# Patient Record
Sex: MALE | Race: WHITE | NOT HISPANIC OR LATINO | Employment: UNEMPLOYED | URBAN - METROPOLITAN AREA
[De-identification: names, ages, dates, MRNs, and addresses within clinical notes are randomized per-mention and may not be internally consistent; named-entity substitution may affect disease eponyms.]

---

## 2017-08-30 NOTE — H&P
DATE OF SCHEDULED SURGERY:  2017.    REASON FOR SURGERY:  Desires surgical correction of abnormal uterine bleeding   and to proceed forward with gender reassignment surgery.    HISTORY OF PRESENT ILLNESS:  This is a 27-year-old  0, with a negative   urine pregnancy test on 2017 who attempts to proceed forward with her   definitive surgical correction of her abnormal uterine bleeding, especially   now that she is on testosterone management and removal of bilateral tubes and   ovaries.  Risks, benefits, and alternatives of all individual procedures were   addressed with the patient in detail over several visits.  She has asked   appropriate questions, signed the appropriate consents, and wished to proceed   forward with surgery as planned.  The patient has undergone a pelvic   ultrasound with a normal endometrial stripe, normal pelvic anatomy otherwise,   negative urine pregnancy test as stated above as well as a normal Pap smear   and cervical cultures.    PAST MEDICAL HISTORY:  lumbago.    PAST SURGICAL HISTORY:  Breast surgery in .    OBSTETRICAL HISTORY:  The patient is a nulligravid.    GYNECOLOGIC HISTORY:  The patient does now report abnormal uterine bleeding,   dysmenorrhea and pelvic pain.  Currently on testosterone injections.  Denies   any previous sexually transmitted diseases or pelvic infections.    SOCIAL HISTORY:  Single.  She denies the use of any alcohol, tobacco, or   recreational drug use.    MEDICATIONS:  Testosterone daily.    ALLERGIES:  No known drug allergies.    PHYSICAL EXAMINATION:  VITAL SIGNS:  She is afebrile, hemodynamically stable.  HEART:  Regular rate and rhythm.  CHEST:  Clear to auscultation bilaterally.  ABDOMEN:  Soft, nondistended, and nontender.  Bowel sounds are present.  PELVIC:  Exam shows normal external female genitalia.  Small uterus.  No   adnexal mass or tenderness to palpation were appreciated.  EXTREMITIES:  Nontender.    ASSESSMENT AND PLAN:  A  27-year-old now with menometrorrhagia, dysmenorrhea,   and pelvic pain in addition to her desire to proceed forward with gender   reassignment surgery interested in proceeding forward with surgery as   described above in the form of laparoscopic-assisted vaginal hysterectomy,   bilateral salpingo-oophorectomy.  Risks, benefits, and alternatives of all   individual procedures were addressed with the patient.  She has asked   appropriate questions, signed the appropriate consents and wishes to proceed   forward with surgery as planned.       ____________________________________     MD NARGIS BOO / ARMANDO    DD:  08/30/2017 14:10:21  DT:  08/30/2017 14:46:47    D#:  [id number]  Job#:  [id number]

## 2017-09-15 DIAGNOSIS — Z01.812 PRE-PROCEDURAL LABORATORY EXAMINATION: ICD-10-CM

## 2017-09-15 LAB
ANION GAP SERPL CALC-SCNC: 10 MMOL/L (ref 0–11.9)
BUN SERPL-MCNC: 9 MG/DL (ref 8–22)
CALCIUM SERPL-MCNC: 10.1 MG/DL (ref 8.5–10.5)
CHLORIDE SERPL-SCNC: 100 MMOL/L (ref 96–112)
CO2 SERPL-SCNC: 29 MMOL/L (ref 20–33)
CREAT SERPL-MCNC: 1.11 MG/DL (ref 0.5–1.4)
ERYTHROCYTE [DISTWIDTH] IN BLOOD BY AUTOMATED COUNT: 39.9 FL (ref 35.9–50)
GFR SERPL CREATININE-BSD FRML MDRD: 58 ML/MIN/1.73 M 2
GLUCOSE SERPL-MCNC: 87 MG/DL (ref 65–99)
HCT VFR BLD AUTO: 45.9 % (ref 37–47)
HGB BLD-MCNC: 15.6 G/DL (ref 12–16)
MCH RBC QN AUTO: 32.1 PG (ref 27–33)
MCHC RBC AUTO-ENTMCNC: 34 G/DL (ref 33.6–35)
MCV RBC AUTO: 94.4 FL (ref 81.4–97.8)
PLATELET # BLD AUTO: 250 K/UL (ref 164–446)
PMV BLD AUTO: 11.2 FL (ref 9–12.9)
POTASSIUM SERPL-SCNC: 3.5 MMOL/L (ref 3.6–5.5)
RBC # BLD AUTO: 4.86 M/UL (ref 4.2–5.4)
SODIUM SERPL-SCNC: 139 MMOL/L (ref 135–145)
WBC # BLD AUTO: 5.8 K/UL (ref 4.8–10.8)

## 2017-09-15 PROCEDURE — 85027 COMPLETE CBC AUTOMATED: CPT

## 2017-09-15 PROCEDURE — 80048 BASIC METABOLIC PNL TOTAL CA: CPT

## 2017-09-15 PROCEDURE — 36415 COLL VENOUS BLD VENIPUNCTURE: CPT

## 2017-09-19 ENCOUNTER — HOSPITAL ENCOUNTER (OUTPATIENT)
Facility: MEDICAL CENTER | Age: 27
End: 2017-09-19
Attending: SPECIALIST | Admitting: SPECIALIST
Payer: MEDICAID

## 2017-09-19 VITALS
TEMPERATURE: 98.4 F | BODY MASS INDEX: 24.91 KG/M2 | RESPIRATION RATE: 16 BRPM | SYSTOLIC BLOOD PRESSURE: 113 MMHG | WEIGHT: 158.73 LBS | HEART RATE: 64 BPM | HEIGHT: 67 IN | OXYGEN SATURATION: 94 % | DIASTOLIC BLOOD PRESSURE: 63 MMHG

## 2017-09-19 PROBLEM — R10.2 ADNEXAL TENDERNESS, RIGHT: Status: ACTIVE | Noted: 2017-09-19

## 2017-09-19 PROBLEM — N92.6 IRREGULAR MENSTRUAL CYCLE: Status: ACTIVE | Noted: 2017-09-19

## 2017-09-19 LAB — HCG SERPL QL: NEGATIVE

## 2017-09-19 PROCEDURE — 501838 HCHG SUTURE GENERAL: Performed by: SPECIALIST

## 2017-09-19 PROCEDURE — 700111 HCHG RX REV CODE 636 W/ 250 OVERRIDE (IP)

## 2017-09-19 PROCEDURE — A4338 INDWELLING CATHETER LATEX: HCPCS | Performed by: SPECIALIST

## 2017-09-19 PROCEDURE — 500854 HCHG NEEDLE, INSUFFLATION FOR STEP: Performed by: SPECIALIST

## 2017-09-19 PROCEDURE — 700101 HCHG RX REV CODE 250

## 2017-09-19 PROCEDURE — 160041 HCHG SURGERY MINUTES - EA ADDL 1 MIN LEVEL 4: Performed by: SPECIALIST

## 2017-09-19 PROCEDURE — 84703 CHORIONIC GONADOTROPIN ASSAY: CPT

## 2017-09-19 PROCEDURE — 88307 TISSUE EXAM BY PATHOLOGIST: CPT

## 2017-09-19 PROCEDURE — 501579 HCHG TROCAR, STEP 5MM: Performed by: SPECIALIST

## 2017-09-19 PROCEDURE — 160002 HCHG RECOVERY MINUTES (STAT): Performed by: SPECIALIST

## 2017-09-19 PROCEDURE — 160029 HCHG SURGERY MINUTES - 1ST 30 MINS LEVEL 4: Performed by: SPECIALIST

## 2017-09-19 PROCEDURE — 501577 HCHG TROCAR, STEP 11MM: Performed by: SPECIALIST

## 2017-09-19 PROCEDURE — 160035 HCHG PACU - 1ST 60 MINS PHASE I: Performed by: SPECIALIST

## 2017-09-19 PROCEDURE — 502703 HCHG DEVICE, LIGASURE V SEALER: Performed by: SPECIALIST

## 2017-09-19 PROCEDURE — 500886 HCHG PACK, LAPAROSCOPY: Performed by: SPECIALIST

## 2017-09-19 PROCEDURE — 160009 HCHG ANES TIME/MIN: Performed by: SPECIALIST

## 2017-09-19 PROCEDURE — 160048 HCHG OR STATISTICAL LEVEL 1-5: Performed by: SPECIALIST

## 2017-09-19 PROCEDURE — 160036 HCHG PACU - EA ADDL 30 MINS PHASE I: Performed by: SPECIALIST

## 2017-09-19 RX ORDER — SIMETHICONE 80 MG
80 TABLET,CHEWABLE ORAL EVERY 8 HOURS PRN
Status: DISCONTINUED | OUTPATIENT
Start: 2017-09-19 | End: 2017-09-19 | Stop reason: HOSPADM

## 2017-09-19 RX ORDER — BUPIVACAINE HYDROCHLORIDE AND EPINEPHRINE 2.5; 5 MG/ML; UG/ML
INJECTION, SOLUTION INFILTRATION; PERINEURAL
Status: DISCONTINUED | OUTPATIENT
Start: 2017-09-19 | End: 2017-09-19 | Stop reason: HOSPADM

## 2017-09-19 RX ORDER — LIDOCAINE HYDROCHLORIDE 10 MG/ML
0.5 INJECTION, SOLUTION INFILTRATION; PERINEURAL
Status: DISCONTINUED | OUTPATIENT
Start: 2017-09-19 | End: 2017-09-19 | Stop reason: HOSPADM

## 2017-09-19 RX ORDER — METOCLOPRAMIDE 5 MG/1
10 TABLET ORAL EVERY 4 HOURS PRN
Status: DISCONTINUED | OUTPATIENT
Start: 2017-09-19 | End: 2017-09-19 | Stop reason: HOSPADM

## 2017-09-19 RX ORDER — LIDOCAINE AND PRILOCAINE 25; 25 MG/G; MG/G
1 CREAM TOPICAL
Status: DISCONTINUED | OUTPATIENT
Start: 2017-09-19 | End: 2017-09-19 | Stop reason: HOSPADM

## 2017-09-19 RX ORDER — ACETAMINOPHEN 500 MG
1000 TABLET ORAL EVERY 6 HOURS
Status: DISCONTINUED | OUTPATIENT
Start: 2017-09-19 | End: 2017-09-19 | Stop reason: HOSPADM

## 2017-09-19 RX ORDER — OXYCODONE HYDROCHLORIDE 5 MG/1
10 TABLET ORAL
Status: DISCONTINUED | OUTPATIENT
Start: 2017-09-19 | End: 2017-09-19 | Stop reason: HOSPADM

## 2017-09-19 RX ORDER — LIDOCAINE HYDROCHLORIDE 40 MG/ML
SOLUTION TOPICAL
Status: DISCONTINUED
Start: 2017-09-19 | End: 2017-09-19 | Stop reason: HOSPADM

## 2017-09-19 RX ORDER — SODIUM CHLORIDE, SODIUM LACTATE, POTASSIUM CHLORIDE, CALCIUM CHLORIDE 600; 310; 30; 20 MG/100ML; MG/100ML; MG/100ML; MG/100ML
INJECTION, SOLUTION INTRAVENOUS CONTINUOUS
Status: DISCONTINUED | OUTPATIENT
Start: 2017-09-19 | End: 2017-09-19 | Stop reason: HOSPADM

## 2017-09-19 RX ORDER — MAGNESIUM SULFATE HEPTAHYDRATE 500 MG/ML
INJECTION, SOLUTION INTRAMUSCULAR; INTRAVENOUS
Status: DISCONTINUED
Start: 2017-09-19 | End: 2017-09-19 | Stop reason: HOSPADM

## 2017-09-19 RX ORDER — ONDANSETRON 2 MG/ML
4 INJECTION INTRAMUSCULAR; INTRAVENOUS EVERY 6 HOURS PRN
Status: DISCONTINUED | OUTPATIENT
Start: 2017-09-19 | End: 2017-09-19 | Stop reason: HOSPADM

## 2017-09-19 RX ADMIN — SODIUM CHLORIDE, SODIUM LACTATE, POTASSIUM CHLORIDE, CALCIUM CHLORIDE: 600; 310; 30; 20 INJECTION, SOLUTION INTRAVENOUS at 12:15

## 2017-09-19 RX ADMIN — FENTANYL CITRATE 50 MCG: 50 INJECTION, SOLUTION INTRAMUSCULAR; INTRAVENOUS at 15:50

## 2017-09-19 ASSESSMENT — PAIN SCALES - GENERAL
PAINLEVEL_OUTOF10: 0
PAINLEVEL_OUTOF10: 2
PAINLEVEL_OUTOF10: 0

## 2017-09-19 NOTE — OR NURSING
REPORT FROM PEYTON ARECHIGA.  PATIENT AWAKE.  DENIES PAIN/NAUSEA.  NOT READY FOR PO FLUIDS.  FAMILY CALLED TO CHECK ON STATUS.  WILL COME TO HOSPITAL WHEN PATIENT READY FOR DISCHARGE.    1630 SLEEPING  1730 GIVEN APPLE JUICE.  VOIDING TRIAL DONE. ABLE TO VOID WITHOUT DIFFICULTY.  BAND AIDS DRY AND IN TACT X 3.  DRESSED AND IN RECLINER.  RIDE CALLED.    1845 RIDE HERE.  DISCHARGE INSTRUCTIONS TO PATIENT AND FRIEND.  PATIENT FEELS READY FOR DISCHARGE.  ALL QUESTIONS ANSWERED.

## 2017-09-19 NOTE — OR NURSING
1525 Received pt from OR, received report from Dr. Gallagher. Pt sleeping, oral airway in place. VS WNL. Pt has 3 bandaids to abdomen, all CDI. No vaginal bleeding observed.     1526 Oral airway dc'd without difficulty.     1550 Pt medicated with fent for pain 4/10 to perineum.     1600 pt sleeping, stable. Report to Vazquez ARECHIGA.

## 2017-09-19 NOTE — OP REPORT
DATE OF SERVICE:  9/19/2017     PREOPERATIVE DIAGNOSES:  Dysfunctional uterine bleeding, dysmenorrhea, pelvic pain     POSTOPERATIVE DIAGNOSES:  Same; final pathology pending     PROCEDURE:  Laparoscopic-assisted vaginal hysterectomy, bilateral    salpingo-oophorectomy     SURGEON:  Ayaan Stuart MD     ASSISTANT:  Mihir Ma MD     ANESTHESIA:  General     ANESTHESIOLOGIST: Anesthesiologist: Zach Gallagher M.D.     ESTIMATED BLOOD LOSS FOR THE PROCEDURE:  50 mL.     FINDINGS:  Small uterus with normal appearing adnexa with adhesive   disease of the ascending colon to the anterior abdominal wall lysed under   direct laparoscopic visualization.     DESCRIPTION OF PROCEDURE:  The patient was taken to the operating room where    general anesthesia was performed without difficulty.  The patient was then    prepped and draped in usual sterile fashion with lower extremities placed in    Gerardo stirrups.  Attention was first turned to the perineum where a weighted    speculum was placed with the use of Goode retractor.  Single tooth tenaculum    was placed on the anterior lip of the cervix.  Hulka uterine manipulator was    then placed.  Single tooth tenaculum and retractor was then removed.     Attention was then turned to the umbilicus where a 1 cm incision was made.  A    Veress needle was placed, 3.5 L of carboperitoneum were then obtained.  A 10    mm trocar port was then placed.  Two separate ports were placed approximately    1/3 of the way from the anterior supra iliac spine lateral to the rectus    muscle under direct laparoscopic visualization with 5 mm ports placed.     Attention was then turned to the pelvis where the distal portion of the    fallopian tubes was then grasped just below the ovary.  This area was grasped,   cauterized, and transected at the level of the infundibulopelvic ligament.     Once the ureters were noted to be coursing far inferior to the operative    field, the broad round main  portion of broad ligaments were then isolated,    cauterized, and transected on each side.  The vesicouterine peritoneum was    grasped, incised, the bladder flap was created.  Uterine vessels were then    clamped, cauterized and transected on each side.  Attention was then turned to   the perineum where a weighted speculum was placed with the use of Goode    retractor, a thyroid tenaculum was placed on the anterior lip, one on the    posterior lips of the cervix.  Cervix was then injected with 10 mL of 0.25%    Marcaine with epinephrine in a circumferential fashion starting anteriorly,    proceeding posterior, proceeding back anterior once again.  The bladder was    pushed anterior and cephalad.  The anterior cul-de-sac was entered sharply.     Right angle Yi retractor was placed upon sharp entry in the anterior    cul-de-sac.  A large duckbill speculum was placed upon sharp entry in the    posterior cul-de-sac.  Uterosacral cardinal complex was then grasped with ZED    clamps, transected and suture ligated with 0 Vicryl suture bilaterally.  The    uterus, both fallopian tubes then handed off the field as specimen.  Excellent   hemostasis was noted.  The anterior and posterior leafs of the peritoneum in    addition to the uterosacral cardinal complexes were reapproximated in the    midline in a pursestring suture using 2-0 Vicryl.  The vaginal cuff was then    reapproximated with figure-of-eight sutures using 0 Vicryl reapproximating    both uterosacral cardinal complexes of the respective vaginal apices.  Once    the vaginal cuff was closed attention was then turned back up to the abdomen and pelvis.  Pelvis was    inspected and noted to be hemostatic, 3.5 L of carboperitoneum removed    followed by removal of the ports under direct laparoscopic visualization.  The   incisions were then reapproximated with single interrupted sutures using 4-0    Vicryl, injected with 20 mL of 0.25% Marcaine with epinephrine.   The patient    tolerated the procedure well and was awoken from general anesthesia, was taken   to recovery in stable condition.        ____________________________________     NIKHIL PETIT MD

## 2017-09-19 NOTE — OR SURGEON
Operative Report    PreOp Diagnosis: Dysfunctional uterine bleeding, dysmenorrhea, pelvic pain    PostOp Diagnosis: Same; final pathology pending    Procedure(s):  VAGINAL HYSTERECTOMY SCOPE TOTAL - Wound Class: Clean Contaminated  SALPINGO OOPHORECTOMY, LYSIS OF ADHESIONS - Wound Class: Clean Contaminated    Surgeon(s):  SHIMA Escobar M.D.    Anesthesiologist/Type of Anesthesia:  Anesthesiologist: Zach Gallagher M.D./General    Surgical Staff:  Circulator: Sherlyn Sarkar R.N.; Faiza Polanco R.N.  Relief Circulator: Kassandra Gordon R.N.  Relief Scrub: Willa Jauregui  Scrub Person: Candelaria Lord    Specimens:  Uterus bilateral fallopian tubes    Estimated Blood Loss: 50ccs    Findings: Small uterus with normal appearing adnexa with adhesive disease of the ascending colon to the anterior abdominal wall lysed under direct laparoscopic visualization    Complications: None        9/19/2017 3:20 PM Ayaan Stuart

## 2017-09-20 NOTE — DISCHARGE INSTRUCTIONS
ACTIVITY: Rest and take it easy for the first 24 hours.  A responsible adult is recommended to remain with you during that time.  It is normal to feel sleepy.  We encourage you to not do anything that requires balance, judgment or coordination.    MILD FLU-LIKE SYMPTOMS ARE NORMAL. YOU MAY EXPERIENCE GENERALIZED MUSCLE ACHES, THROAT IRRITATION, HEADACHE AND/OR SOME NAUSEA.    FOR 24 HOURS DO NOT:  Drive, operate machinery or run household appliances.  Drink beer or alcoholic beverages.   Make important decisions or sign legal documents.    SPECIAL INSTRUCTIONS: *PLEASE SEE INSTRUCTION SHEET*.  NO HEAVY LIFTING.  *    DIET: To avoid nausea, slowly advance diet as tolerated, avoiding spicy or greasy foods for the first day.  Add more substantial food to your diet according to your physician's instructions.  Babies can be fed formula or breast milk as soon as they are hungry.  INCREASE FLUIDS AND FIBER TO AVOID CONSTIPATION.    SURGICAL DRESSING/BATHING: *OK TO SHOWER.  NO TUB BATHS, HOT TUBS OR SOAKING**    FOLLOW-UP APPOINTMENT:  A follow-up appointment should be arranged with your doctor; call to schedule.    You should CALL YOUR PHYSICIAN if you develop:  Fever greater than 101 degrees F.  Pain not relieved by medication, or persistent nausea or vomiting.  Excessive bleeding (blood soaking through dressing) or unexpected drainage from the wound.  Extreme redness or swelling around the incision site, drainage of pus or foul smelling drainage.  Inability to urinate or empty your bladder within 8 hours.  Problems with breathing or chest pain.    You should call 911 if you develop problems with breathing or chest pain.  If you are unable to contact your doctor or surgical center, you should go to the nearest emergency room or urgent care center.    Physician's telephone #: **[phone]*    If any questions arise, call your doctor.  If your doctor is not available, please feel free to call the Surgical Center at  [phone].  The Center is open Monday through Friday from 7AM to 7PM.  You can also call the HEALTH HOTLINE open 24 hours/day, 7 days/week and speak to a nurse at [phone], or toll free at [phone].    A registered nurse may call you a few days after your surgery to see how you are doing after your procedure.    MEDICATIONS: Resume taking daily medication.  Take prescribed pain medication with food.  If no medication is prescribed, you may take non-aspirin pain medication if needed.  PAIN MEDICATION CAN BE VERY CONSTIPATING.  Take a stool softener or laxative such as senokot, pericolace, or milk of magnesia if needed.     Last pain medication given at *________________**.    If your physician has prescribed pain medication that includes Acetaminophen (Tylenol), do not take additional Acetaminophen (Tylenol) while taking the prescribed medication.    Depression / Suicide Risk    As you are discharged from this [facility] facility, it is important to learn how to keep safe from harming yourself.    Recognize the warning signs:  · Abrupt changes in personality, positive or negative- including increase in energy   · Giving away possessions  · Change in eating patterns- significant weight changes-  positive or negative  · Change in sleeping patterns- unable to sleep or sleeping all the time   · Unwillingness or inability to communicate  · Depression  · Unusual sadness, discouragement and loneliness  · Talk of wanting to die  · Neglect of personal appearance   · Rebelliousness- reckless behavior  · Withdrawal from people/activities they love  · Confusion- inability to concentrate     If you or a loved one observes any of these behaviors or has concerns about self-harm, here's what you can do:  · Talk about it- your feelings and reasons for harming yourself  · Remove any means that you might use to hurt yourself (examples: pills, rope, extension cords, firearm)  · Get professional help from the community  (Mental Health, Substance Abuse, psychological counseling)  · Do not be alone:Call your Safe Contact- someone whom you trust who will be there for you.  · Call your local CRISIS HOTLINE 193-8658 or 814-746-8325  · Call your local Children's Mobile Crisis Response Team [place] (548) 392-2522 or www.[employer]  · Call the toll free National Suicide Prevention Hotlines   · National Suicide Prevention Lifeline 061-856-WTUX (7638)  · National Hope Line Network 800-SUICIDE (704-9141)

## 2020-01-15 ENCOUNTER — OFFICE VISIT (OUTPATIENT)
Dept: URGENT CARE | Facility: CLINIC | Age: 30
End: 2020-01-15
Payer: MEDICAID

## 2020-01-15 VITALS
RESPIRATION RATE: 16 BRPM | WEIGHT: 167.8 LBS | DIASTOLIC BLOOD PRESSURE: 72 MMHG | HEIGHT: 67 IN | TEMPERATURE: 97.4 F | SYSTOLIC BLOOD PRESSURE: 110 MMHG | HEART RATE: 66 BPM | OXYGEN SATURATION: 93 % | BODY MASS INDEX: 26.34 KG/M2

## 2020-01-15 DIAGNOSIS — S39.012A STRAIN OF LUMBAR REGION, INITIAL ENCOUNTER: ICD-10-CM

## 2020-01-15 PROCEDURE — 99202 OFFICE O/P NEW SF 15 MIN: CPT | Performed by: PHYSICIAN ASSISTANT

## 2020-01-15 ASSESSMENT — ENCOUNTER SYMPTOMS
FOCAL WEAKNESS: 0
TINGLING: 0
HEADACHES: 0
ABDOMINAL PAIN: 0
FALLS: 0
COUGH: 0
NECK PAIN: 0
MYALGIAS: 1
SENSORY CHANGE: 0
WEAKNESS: 0
CHILLS: 0
BACK PAIN: 1
SORE THROAT: 0
BLURRED VISION: 0
CONSTIPATION: 0
SHORTNESS OF BREATH: 0
VOMITING: 0
NAUSEA: 0
DIZZINESS: 0
FEVER: 0
EYE DISCHARGE: 0

## 2020-01-15 NOTE — PROGRESS NOTES
"Subjective:      Anthony Pinon is a 29 y.o. adult who presents with Letter for School/Work (dr note) and Pelvic Pain (left side and left buttocks, gotten worse, tried streching did not work, when streching it hurts, throbbing pain  x 2 months )            HPI  29-year-old adult presents to urgent care complaining of left-sided low back pain that intermittently radiates down left lower extremity.  Patient reports this has been ongoing for the last 2 months.  He reports pain increases with certain movements.  He reports pain started after working out and performing back extensions.  He has not tried any over-the-counter medications for his symptoms.  Patient states he has tried ice and heat with no resolve of pain.  He denies saddle anesthesia, bowel bladder incontinence, or lower extremity weakness.  Patient also requesting letter stating he underwent a sex orientation change in order to change birth certificate and legal documents.    Review of Systems   Constitutional: Negative for chills and fever.   HENT: Negative for congestion and sore throat.    Eyes: Negative for blurred vision and discharge.   Respiratory: Negative for cough and shortness of breath.    Cardiovascular: Negative for chest pain.   Gastrointestinal: Negative for abdominal pain, constipation, nausea and vomiting.   Genitourinary: Negative for dysuria.   Musculoskeletal: Positive for back pain and myalgias. Negative for falls, joint pain and neck pain.   Skin: Negative for rash.   Neurological: Negative for dizziness, tingling, sensory change, focal weakness, weakness and headaches.       Past Medical History:   Diagnosis Date   • Agitation 09/15/2017    \"Seeing (F) in relation to me creates great agitation\".   • Depression 09/15/2017   • Schizoaffective disorder (HCC) 09/15/2017     Current Outpatient Medications on File Prior to Visit   Medication Sig Dispense Refill   • TESTOSTERONE IM 0.5 mL by Intramuscular route every 7 days.       No " "current facility-administered medications on file prior to visit.      Allergies   Allergen Reactions   • Nkda [No Known Drug Allergy]      NKA     Social History     Tobacco Use   • Smoking status: Former Smoker     Types: Cigarettes     Last attempt to quit: 9/15/2012     Years since quittin.3   • Smokeless tobacco: Never Used   • Tobacco comment: quit 3 days ago (7-3-08)   Substance Use Topics   • Alcohol use: No      Objective:     /72   Pulse 66   Temp 36.3 °C (97.4 °F) (Temporal)   Resp 16   Ht 1.689 m (5' 6.5\")   Wt 76.1 kg (167 lb 12.8 oz)   SpO2 93%   BMI 26.68 kg/m²      Physical Exam  Vitals signs reviewed.   Constitutional:       Appearance: He is well-developed.   HENT:      Head: Normocephalic and atraumatic.   Eyes:      Conjunctiva/sclera: Conjunctivae normal.   Neck:      Musculoskeletal: Normal range of motion and neck supple.   Cardiovascular:      Rate and Rhythm: Normal rate.   Pulmonary:      Effort: Pulmonary effort is normal. No respiratory distress.   Abdominal:      Palpations: Abdomen is soft.   Musculoskeletal:        Back:    Skin:     General: Skin is warm and dry.   Neurological:      General: No focal deficit present.      Mental Status: He is alert and oriented to person, place, and time.      Cranial Nerves: No cranial nerve deficit.      Sensory: No sensory deficit.      Motor: No weakness.      Gait: Gait normal.   Psychiatric:         Mood and Affect: Mood normal.         Behavior: Behavior normal.         Thought Content: Thought content normal.         Judgment: Judgment normal.                 Assessment/Plan:     1. Strain of lumbar region, initial encounter       Recommend over-the-counter NSAIDs for pain and inflammation.  Avoid back extensions.  Advised patient to perform gentle range of motion exercises and stretching as tolerated with pain.  Patient given strict return precautions for any worsening of symptoms including development of saddle anesthesia, " bowel or bladder incontinence, or lower extremity weakness.    Advised patient to follow-up with surgeon who performed total hysterectomy and oophorectomy regarding legal documentation for sexual orientation change.     Patient verbalized understanding of treatment plan and has no further questions regarding care.

## (undated) DEVICE — GLOVE BIOGEL SZ 7 SURGICAL PF LTX - (50PR/BX 4BX/CA)

## (undated) DEVICE — SET EXTENSION WITH 2 PORTS (48EA/CA) ***PART #2C8610 IS A SUBSTITUTE*****

## (undated) DEVICE — GOWN SURGEONS LARGE - (32/CA)

## (undated) DEVICE — SUCTION INSTRUMENT YANKAUER BULBOUS TIP W/O VENT (50EA/CA)

## (undated) DEVICE — BANDAID SHEER STRIP 3/4 IN (100EA/BX 12BX/CA)

## (undated) DEVICE — SODIUM CHL IRRIGATION 0.9% 1000ML (12EA/CA)

## (undated) DEVICE — SENSOR SPO2 NEO LNCS ADHESIVE (20/BX) SEE USER NOTES

## (undated) DEVICE — CANISTER SUCTION 3000ML MECHANICAL FILTER AUTO SHUTOFF MEDI-VAC NONSTERILE LF DISP  (40EA/CA)

## (undated) DEVICE — HEAD HOLDER JUNIOR/ADULT

## (undated) DEVICE — SET LEADWIRE 5 LEAD BEDSIDE DISPOSABLE ECG (1SET OF 5/EA)

## (undated) DEVICE — ELECTRODE DUAL RETURN W/ CORD - (50/PK)

## (undated) DEVICE — TUBING SETDISPOS HIGH FLOW II - (10/BX)

## (undated) DEVICE — GLOVE BIOGEL PI INDICATOR SZ 7.0 SURGICAL PF LF - (50/BX 4BX/CA)

## (undated) DEVICE — PACK LAPAROSCOPY - (1/CA)

## (undated) DEVICE — DRAPE VAGINAL BIB W/ POUCH (10EA/CA)

## (undated) DEVICE — CATHETER IV 20 GA X 1-1/4 ---SURG.& SDS ONLY--- (50EA/BX)

## (undated) DEVICE — SUTURE 2-0 VICRYL PLUS CT-1 36 (36PK/BX)"

## (undated) DEVICE — SUTURE GENERAL

## (undated) DEVICE — NEPTUNE 4 PORT MANIFOLD - (20/PK)

## (undated) DEVICE — WATER IRRIGATION STERILE 1000ML (12EA/CA)

## (undated) DEVICE — TROCAR STEP 11MM - (3/CA)

## (undated) DEVICE — PACK MINOR BASIN - (2EA/CA)

## (undated) DEVICE — GLOVE BIOGEL INDICATOR SZ 7.5 SURGICAL PF LTX - (50PR/BX 4BX/CA)

## (undated) DEVICE — TUBING CLEARLINK DUO-VENT - C-FLO (48EA/CA)

## (undated) DEVICE — BANDAID X-LARGE 2 X 4 IN LF (50EA/BX)

## (undated) DEVICE — KIT ANESTHESIA W/CIRCUIT & 3/LT BAG W/FILTER (20EA/CA)

## (undated) DEVICE — GLOVE BIOGEL SZ 6.5 SURGICAL PF LTX (50PR/BX 4BX/CA)

## (undated) DEVICE — GLOVE BIOGEL SZ 8 SURGICAL PF LTX - (50PR/BX 4BX/CA)

## (undated) DEVICE — GLOVE SZ 7 BIOGEL PI MICRO - PF LF (50PR/BX 4BX/CA)

## (undated) DEVICE — MASK ANESTHESIA ADULT  - (100/CA)

## (undated) DEVICE — TROCAR STEP 5MM - (3/CA)

## (undated) DEVICE — KIT  I.V. START (100EA/CA)

## (undated) DEVICE — SET SUCTION/IRRIGATION WITH DISPOSABLE TIP (6/CA )PART #0250-070-520 IS A SUB

## (undated) DEVICE — TRAY SRGPRP PVP IOD WT PRP - (20/CA)

## (undated) DEVICE — NEEDLE INSUFFLATION FOR STEP - (12/BX)

## (undated) DEVICE — PAD SANITARY 11IN MAXI IND WRAPPED  (12EA/PK 24PK/CA)

## (undated) DEVICE — SUTURE 0 VICRYL PLUS CT-1 - 8 X 18 INCH (12/BX)

## (undated) DEVICE — ARMREST CRADLE FOAM - (2PR/PK 12PR/CA)

## (undated) DEVICE — TUBE E-T HI-LO CUFF 7.0MM (10EA/PK)

## (undated) DEVICE — LIGASURE LAPAROSCOPIC 5MM - (6EA/CA)

## (undated) DEVICE — SUTURE 4-0 VICRYL PLUS FS-2 - 27 INCH (36/BX)

## (undated) DEVICE — GLOVESZ 8.5 BIOGEL PI MICRO - PF LF (50PR/BX)

## (undated) DEVICE — CANISTER SUCTION RIGID RED 1500CC (40EA/CA)

## (undated) DEVICE — ELECTRODE 850 FOAM ADHESIVE - HYDROGEL RADIOTRNSPRNT (50/PK)

## (undated) DEVICE — LACTATED RINGERS INJ 1000 ML - (14EA/CA 60CA/PF)

## (undated) DEVICE — TRAY FOLEY CATHETER STATLOCK 16FR SURESTEP  (10EA/CA)

## (undated) DEVICE — GLOVE BIOGEL PI INDICATOR SZ 6.5 SURGICAL PF LF - (50/BX 4BX/CA)

## (undated) DEVICE — TUBE CONNECTING SUCTION - CLEAR PLASTIC STERILE 72 IN (50EA/CA)